# Patient Record
Sex: FEMALE | Race: OTHER | HISPANIC OR LATINO
[De-identification: names, ages, dates, MRNs, and addresses within clinical notes are randomized per-mention and may not be internally consistent; named-entity substitution may affect disease eponyms.]

---

## 2021-10-18 PROBLEM — Z00.00 ENCOUNTER FOR PREVENTIVE HEALTH EXAMINATION: Status: ACTIVE | Noted: 2021-10-18

## 2021-10-19 NOTE — PAST MEDICAL HISTORY
[Menstruating] : The patient is menstruating [Definite ___ (Date)] : the last menstrual period was [unfilled] [Total Preg ___] : G[unfilled] [Live Births ___] : P[unfilled]  [Abortions ___] : Abortions:[unfilled]

## 2021-10-25 ENCOUNTER — APPOINTMENT (OUTPATIENT)
Dept: GYNECOLOGIC ONCOLOGY | Facility: CLINIC | Age: 31
End: 2021-10-25
Payer: MEDICAID

## 2021-10-25 VITALS
BODY MASS INDEX: 34.55 KG/M2 | HEART RATE: 71 BPM | HEIGHT: 60 IN | SYSTOLIC BLOOD PRESSURE: 129 MMHG | DIASTOLIC BLOOD PRESSURE: 82 MMHG | WEIGHT: 176 LBS

## 2021-10-25 DIAGNOSIS — Z80.6 FAMILY HISTORY OF LEUKEMIA: ICD-10-CM

## 2021-10-25 PROCEDURE — 99205 OFFICE O/P NEW HI 60 MIN: CPT | Mod: 25

## 2021-10-25 PROCEDURE — 57420 EXAM OF VAGINA W/SCOPE: CPT

## 2021-10-25 NOTE — LETTER BODY
[Dear  ___] : Dear  [unfilled], [FreeTextEntry2] : I had the pleasure of seeing Ashwini Eisenberg in consultation regarding the recent finding of BECKY on a cervical biopsy. An ECC was negative. The preceding Pap test had evidence of ASC-H and AGC, with high risk HPV (16) identified. \par \par After an examination and colposcopy, we discussed the nature of lower genital tract disease, her clinical findings, and management options, including my advise for [facility] review of the Pap and biopsies. We discussed my advise for an excisional biopsy, the nature and attendant risks of we discussed thoroughly. We also noted the possible need for additional intervention as well as her desire to retain fertility if possible. \par \par I will keep you informed as to her findings and disposition.

## 2021-10-25 NOTE — HISTORY OF PRESENT ILLNESS
[FreeTextEntry1] : Referring GYN - Dr. Vi Zelaya\par PCP - Dr. Chilango Faria\par \par Ms. Eisenberg is a 30 yo  premenopausal female (LMP 10/4/21)who presents for initial consultation at the request of Dr. Zelaya for the management of cervical dysplasia. Patient was seen for GYN exam 21 at which time PAP was noted to be ASC-H, HrHPV +, atypical glandular cells. She returned for colposcopy 10/2/21 revealed AWC at 10:00 which was biopsies and ECC was performed. Biopsy at 10:00 revealed BECKY 3. She was referred here for further management. \par \par She desires future fertility. \par \par She reports postcoital bleeding. She is currently on OCPS. She is a non smoker and denies immunosuppressants. She did not receive the Gardasil vaccine series. Tolerating PO without N/V. Denies a change in appetite or weight. Reports normal bowel and urinary function. No other associated signs or symptoms. \par \par PAP history:\par PAP on 21: ASC-H, HrHPV + - colposcopy was negative\par PAP on 21: ASC-H, HrHPV + (16), atypical glandular cells: endocervical type, favor neoplastic\par Colposcopy on 10/2/21 - AWC at 10:00 which was biopsies and ECC was performed. \par \par \par PATHOLOGY:\par 1) ECC, 21\par     a) benign endocervix\par 2) Cervical biopsy at 2:00 and 9:00, 21\par     a) squamous metaplasia\par 3) Cervical biopsy at 10:00, 10/3/21\par      a) BECKY 3 involving endocervical glands\par      b) ECC - benign endocervical mucosa\par \par PMHx: N/A\par PSHx: N/A\par Fam Hx: paternal uncle (leukemia)\par \par \par HCM:\par Mammogram: never\par CBE: 2021\par SBE: performs\par Colonoscopy: never\par COVID-19 vaccine series completed, 2021, Pfizer\par \par

## 2021-10-25 NOTE — PHYSICAL EXAM
[Absent] : CVAT: absent [Normal] : Recto-Vaginal Exam: Normal [Abnormal] : Bimanual Exam: Abnormal [de-identified] : Trace edema [de-identified] : Nab cysts, multiple [de-identified] : The uterus was enlarged to 10-12 weeks, mobile [de-identified] : The adnexa were nonpalpable [de-identified] : The uterus was enlarged

## 2021-10-25 NOTE — PROCEDURE
[Colposcopy] : colposcopy [Cervical Dysplasia] : cervical dysplasia [Patient] : the patient [Verbal Consent] : verbal consent was obtained prior to the procedure and is detailed in the patient's record [Site Verification] : site verification performed. [SCJ Fully Visualized] : the squamocolumnar junction was fully visualized [No Abnormalities] : no abnormalities seen [Acetowhite ___ o'clock] : ascetowhite changes at the [unfilled] ~Uo'clock position [Punctation ___ o'clock] : punctation at the [unfilled] ~Uo'clock position [Direct Pressure] : direct pressure [No Complications] : none [Tolerated Well] : the patient tolerated the procedure well [Post procedure instructions and information given] : post procedure instructions and information given and reviewed with patient. [Atypical Vessels ___ o'clock] : no atypical vessels [Mosaicism ___ o'clock] : no mosaicism [Biopsies Taken: # ___] : no biopsies were taken of the cervix [ECC Done] : an endocervical curettage was not performed [FreeTextEntry3] : See diagram; throughout area noted [de-identified] : Vagina evaluated

## 2021-10-25 NOTE — DISCUSSION/SUMMARY
[Reviewed Clinical Lab Test(s)] : Results of clinical tests were reviewed. [FreeTextEntry1] : I met with the pt (without those accompanying her at her request. \par -We discussed the clinical findings and nature of LGT Dz (incl HPV [answering her questions r/t it being an STI and male eval]). She hopes to retain fertiliy (noting a prior disc of hyst with her referring gyn).\par -Management options were reviewed, including my advise for an excisional biopsy. We disc the tech aspects of both LEEP and CKC and dist them from ablative therapies. \par -Periop and recuperative issues were discussed, as were the poss hazards and sequelae (incl OB) of an excisional biopsy. \par -A diagram was drawn and a copy provided.\par -I advised review of the Pap and Bx's.\par -Her instructions were reviewed.\par -All Q/A.\par -She will call nw for the review and final recs.

## 2021-10-28 ENCOUNTER — OUTPATIENT (OUTPATIENT)
Dept: OUTPATIENT SERVICES | Facility: HOSPITAL | Age: 31
LOS: 1 days | End: 2021-10-28
Payer: COMMERCIAL

## 2021-10-28 DIAGNOSIS — C80.1 MALIGNANT (PRIMARY) NEOPLASM, UNSPECIFIED: ICD-10-CM

## 2021-10-29 ENCOUNTER — RESULT REVIEW (OUTPATIENT)
Age: 31
End: 2021-10-29

## 2021-10-29 PROCEDURE — 88321 CONSLTJ&REPRT SLD PREP ELSWR: CPT

## 2021-11-01 ENCOUNTER — RESULT REVIEW (OUTPATIENT)
Age: 31
End: 2021-11-01

## 2021-11-03 ENCOUNTER — NON-APPOINTMENT (OUTPATIENT)
Age: 31
End: 2021-11-03

## 2021-12-01 ENCOUNTER — OUTPATIENT (OUTPATIENT)
Dept: OUTPATIENT SERVICES | Facility: HOSPITAL | Age: 31
LOS: 1 days | End: 2021-12-01

## 2021-12-01 VITALS
OXYGEN SATURATION: 98 % | HEIGHT: 60 IN | RESPIRATION RATE: 16 BRPM | DIASTOLIC BLOOD PRESSURE: 70 MMHG | HEART RATE: 88 BPM | WEIGHT: 177.91 LBS | TEMPERATURE: 97 F | SYSTOLIC BLOOD PRESSURE: 110 MMHG

## 2021-12-01 DIAGNOSIS — D06.9 CARCINOMA IN SITU OF CERVIX, UNSPECIFIED: ICD-10-CM

## 2021-12-01 DIAGNOSIS — Z98.890 OTHER SPECIFIED POSTPROCEDURAL STATES: Chronic | ICD-10-CM

## 2021-12-01 LAB
ALBUMIN SERPL ELPH-MCNC: 4.3 G/DL — SIGNIFICANT CHANGE UP (ref 3.3–5)
ALP SERPL-CCNC: 96 U/L — SIGNIFICANT CHANGE UP (ref 40–120)
ALT FLD-CCNC: 24 U/L — SIGNIFICANT CHANGE UP (ref 4–33)
ANION GAP SERPL CALC-SCNC: 11 MMOL/L — SIGNIFICANT CHANGE UP (ref 7–14)
AST SERPL-CCNC: 17 U/L — SIGNIFICANT CHANGE UP (ref 4–32)
BILIRUB SERPL-MCNC: 0.2 MG/DL — SIGNIFICANT CHANGE UP (ref 0.2–1.2)
BUN SERPL-MCNC: 12 MG/DL — SIGNIFICANT CHANGE UP (ref 7–23)
CALCIUM SERPL-MCNC: 9.5 MG/DL — SIGNIFICANT CHANGE UP (ref 8.4–10.5)
CHLORIDE SERPL-SCNC: 103 MMOL/L — SIGNIFICANT CHANGE UP (ref 98–107)
CO2 SERPL-SCNC: 25 MMOL/L — SIGNIFICANT CHANGE UP (ref 22–31)
CREAT SERPL-MCNC: 0.5 MG/DL — SIGNIFICANT CHANGE UP (ref 0.5–1.3)
GLUCOSE SERPL-MCNC: 81 MG/DL — SIGNIFICANT CHANGE UP (ref 70–99)
HCG UR QL: NEGATIVE — SIGNIFICANT CHANGE UP
HCT VFR BLD CALC: 43.3 % — SIGNIFICANT CHANGE UP (ref 34.5–45)
HGB BLD-MCNC: 13.7 G/DL — SIGNIFICANT CHANGE UP (ref 11.5–15.5)
MCHC RBC-ENTMCNC: 31.6 GM/DL — LOW (ref 32–36)
MCHC RBC-ENTMCNC: 31.6 PG — SIGNIFICANT CHANGE UP (ref 27–34)
MCV RBC AUTO: 100 FL — SIGNIFICANT CHANGE UP (ref 80–100)
NRBC # BLD: 0 /100 WBCS — SIGNIFICANT CHANGE UP
NRBC # FLD: 0 K/UL — SIGNIFICANT CHANGE UP
PLATELET # BLD AUTO: 288 K/UL — SIGNIFICANT CHANGE UP (ref 150–400)
POTASSIUM SERPL-MCNC: 3.6 MMOL/L — SIGNIFICANT CHANGE UP (ref 3.5–5.3)
POTASSIUM SERPL-SCNC: 3.6 MMOL/L — SIGNIFICANT CHANGE UP (ref 3.5–5.3)
PROT SERPL-MCNC: 7.5 G/DL — SIGNIFICANT CHANGE UP (ref 6–8.3)
RBC # BLD: 4.33 M/UL — SIGNIFICANT CHANGE UP (ref 3.8–5.2)
RBC # FLD: 12.7 % — SIGNIFICANT CHANGE UP (ref 10.3–14.5)
SODIUM SERPL-SCNC: 139 MMOL/L — SIGNIFICANT CHANGE UP (ref 135–145)
WBC # BLD: 7.53 K/UL — SIGNIFICANT CHANGE UP (ref 3.8–10.5)
WBC # FLD AUTO: 7.53 K/UL — SIGNIFICANT CHANGE UP (ref 3.8–10.5)

## 2021-12-01 RX ORDER — SODIUM CHLORIDE 9 MG/ML
1000 INJECTION, SOLUTION INTRAVENOUS
Refills: 0 | Status: DISCONTINUED | OUTPATIENT
Start: 2021-12-07 | End: 2021-12-21

## 2021-12-01 NOTE — H&P PST ADULT - HISTORY OF PRESENT ILLNESS
30 y/o female presents to PST for pre op evaluation with hx of abnormal pap smear. S/P Colposcopy    30 y/o female presents to [facility] for pre op evaluation with hx of abnormal pap smear. S/P Colposcopy. 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 Now schedule for cold knife biopsy

## 2021-12-01 NOTE — H&P PST ADULT - PROBLEM SELECTOR PLAN 1
Schedule for cold knife biopsy tentatively for 12/07/2021. Pre op instructions, famotidine given and explained. Pt verbalized understanding.   Pt instructed to bring urine specimen on day of surgery, urine cup given to pt who verbalized understanding.  Pt confirmed schedule appt for covid test pre op

## 2021-12-01 NOTE — H&P PST ADULT - ATTENDING COMMENTS
Seen in ASU with her support person. She reports no changes. Planned procedure disc. Consent form reviewed. Instructions disc, incl VB, VD, pelvic rest, pain mgmt, f/u. All Q/A.

## 2021-12-01 NOTE — H&P PST ADULT - NEGATIVE GASTROINTESTINAL SYMPTOMS
no nausea/no vomiting/no diarrhea/no constipation/no change in bowel habits/no abdominal pain/no steatorrhea/no jaundice/no hiccoughs

## 2021-12-04 ENCOUNTER — APPOINTMENT (OUTPATIENT)
Dept: DISASTER EMERGENCY | Facility: CLINIC | Age: 31
End: 2021-12-04

## 2021-12-04 DIAGNOSIS — Z01.818 ENCOUNTER FOR OTHER PREPROCEDURAL EXAMINATION: ICD-10-CM

## 2021-12-05 LAB — SARS-COV-2 N GENE NPH QL NAA+PROBE: NOT DETECTED

## 2021-12-06 ENCOUNTER — TRANSCRIPTION ENCOUNTER (OUTPATIENT)
Age: 31
End: 2021-12-06

## 2021-12-06 NOTE — ASU PATIENT PROFILE, ADULT - FALL HARM RISK - UNIVERSAL INTERVENTIONS
Bed in lowest position, wheels locked, appropriate side rails in place/Call bell, personal items and telephone in reach/Instruct patient to call for assistance before getting out of bed or chair/Non-slip footwear when patient is out of bed/[place] to call system/Physically safe environment - no spills, clutter or unnecessary equipment/Purposeful Proactive Rounding/Room/bathroom lighting operational, light cord in reach

## 2021-12-07 ENCOUNTER — RESULT REVIEW (OUTPATIENT)
Age: 31
End: 2021-12-07

## 2021-12-07 ENCOUNTER — APPOINTMENT (OUTPATIENT)
Dept: GYNECOLOGIC ONCOLOGY | Facility: HOSPITAL | Age: 31
End: 2021-12-07

## 2021-12-07 ENCOUNTER — OUTPATIENT (OUTPATIENT)
Dept: OUTPATIENT SERVICES | Facility: HOSPITAL | Age: 31
LOS: 1 days | Discharge: HOME | End: 2021-12-07
Payer: MEDICAID

## 2021-12-07 VITALS
WEIGHT: 177.91 LBS | SYSTOLIC BLOOD PRESSURE: 122 MMHG | DIASTOLIC BLOOD PRESSURE: 80 MMHG | HEIGHT: 60 IN | HEART RATE: 88 BPM | OXYGEN SATURATION: 100 % | TEMPERATURE: 99 F | RESPIRATION RATE: 18 BRPM

## 2021-12-07 VITALS
OXYGEN SATURATION: 94 % | DIASTOLIC BLOOD PRESSURE: 78 MMHG | RESPIRATION RATE: 18 BRPM | SYSTOLIC BLOOD PRESSURE: 100 MMHG

## 2021-12-07 DIAGNOSIS — D06.9 CARCINOMA IN SITU OF CERVIX, UNSPECIFIED: ICD-10-CM

## 2021-12-07 DIAGNOSIS — Z98.890 OTHER SPECIFIED POSTPROCEDURAL STATES: Chronic | ICD-10-CM

## 2021-12-07 LAB — HCG UR QL: NEGATIVE — SIGNIFICANT CHANGE UP

## 2021-12-07 PROCEDURE — 88305 TISSUE EXAM BY PATHOLOGIST: CPT | Mod: 26

## 2021-12-07 PROCEDURE — 88307 TISSUE EXAM BY PATHOLOGIST: CPT | Mod: 26

## 2021-12-07 PROCEDURE — 57520 CONIZATION OF CERVIX: CPT

## 2021-12-07 RX ORDER — IBUPROFEN 200 MG
1 TABLET ORAL
Qty: 0 | Refills: 0 | DISCHARGE

## 2021-12-07 NOTE — BRIEF OPERATIVE NOTE - OPERATION/FINDINGS
EUA: Nl v, v, parous cx, pm. The uterus was not palpably enlarged, There were no adnexal masses.   A wide deep cone was fashioned intact and around the Lugol's neg areas. An ECC was then performed.   The cone bed and margins were cauterized. After a period of observation, monsel's was applied. After hemostasis was confirmed. SS placed, not secured.   Further obs noted hemostasis.   Urine clear pre- and post-op.  CC.

## 2021-12-07 NOTE — ASU DISCHARGE PLAN (ADULT/PEDIATRIC) - CARE PROVIDER_API CALL
Mitch Varela)  Gynecologic Oncology; Obstetrics and Gynecology  [address] [place] [address]  Phone: [phone]  Fax: [phone]  Follow Up Time:    WDL

## 2021-12-07 NOTE — ASU DISCHARGE PLAN (ADULT/PEDIATRIC) - ASU DC SPECIAL INSTRUCTIONSFT
Regular diet as tolerated, regular activity as tolerated, no heavy lifting for first two weeks.  Nothing per vagina: no intercourse, tampons or douching.  Call your provider if you experience fevers, chills, worsening abdominal pain, inability to urinate or worsening vaginal bleeding.  Follow up with Dr. Varela on 12/20/21 at 4:40pm.

## 2021-12-07 NOTE — ASU DISCHARGE PLAN (ADULT/PEDIATRIC) - NS MD DC FALL RISK RISK
For information on Fall & Injury Prevention, visit: [email or web address] OR  [email or web address] OR  https://www.cdc.gov/steadi/patient.html

## 2021-12-07 NOTE — ASU DISCHARGE PLAN (ADULT/PEDIATRIC) - NURSING INSTRUCTIONS
You received IV Tylenol for pain management at __2pm_. Please DO NOT take any Tylenol (Acetaminophen) containing products, such as Vicodin, Percocet, Excedrin, and cold medications for the next 6 hours (until __8_ PM). DO NOT TAKE MORE THAN 4000 MG OF TYLENOL in a 24 hour period.     You received IV Toradol for pain management at _2:45pm__. Please DO NOT take Motrin/Ibuprofen/Advil/Aleve/NSAIDs (Non-Steroidal Anti-Inflammatory Drugs) for the next 6 hours (until _8:45__ PM).

## 2021-12-08 ENCOUNTER — NON-APPOINTMENT (OUTPATIENT)
Age: 31
End: 2021-12-08

## 2021-12-10 ENCOUNTER — NON-APPOINTMENT (OUTPATIENT)
Age: 31
End: 2021-12-10

## 2021-12-10 LAB — SURGICAL PATHOLOGY STUDY: SIGNIFICANT CHANGE UP

## 2021-12-20 ENCOUNTER — APPOINTMENT (OUTPATIENT)
Dept: GYNECOLOGIC ONCOLOGY | Facility: CLINIC | Age: 31
End: 2021-12-20
Payer: MEDICAID

## 2021-12-20 VITALS — TEMPERATURE: 98 F | SYSTOLIC BLOOD PRESSURE: 134 MMHG | HEART RATE: 86 BPM | DIASTOLIC BLOOD PRESSURE: 86 MMHG

## 2021-12-20 PROCEDURE — 99024 POSTOP FOLLOW-UP VISIT: CPT

## 2021-12-23 NOTE — REASON FOR VISIT
[de-identified] : 12/7/21 [de-identified] : Cone biopsy/ECC [de-identified] : She reports feeling well. No abnl VB or VD. No fever. No GI or  concerns. \par

## 2021-12-23 NOTE — DISCUSSION/SUMMARY
[Doing Well] : is doing well [FreeTextEntry9] : s/nt/nd.  [de-identified] : op site healing with the some residual hemostatic material.

## 2021-12-23 NOTE — LETTER BODY
[Dear  ___] : Dear  [unfilled], [FreeTextEntry2] : This letter is to provide follow up on Ashwini Eisenberg who underwent a cone biopsy. \par \par She is recuperating well. The final pathology revealed high grade BECKY with gland involvement. The margins of resection and ECC were negative. A copy of the pathology report was provided to her. \par \par I have asked her to return for an evaluation in 6 months and see you for general gynecologic care.

## 2021-12-23 NOTE — ASSESSMENT
[FreeTextEntry1] : She is recuperating well and we discussed her path; a copy was provided. Her instructions were discussed. All Q/A. She'll RTO in 6m and see her primary Gyn for WWC.\par

## 2022-07-09 PROBLEM — E66.9 OBESITY, UNSPECIFIED: Chronic | Status: ACTIVE | Noted: 2021-12-01

## 2022-08-08 ENCOUNTER — NON-APPOINTMENT (OUTPATIENT)
Age: 32
End: 2022-08-08

## 2022-08-08 ENCOUNTER — APPOINTMENT (OUTPATIENT)
Dept: GYNECOLOGIC ONCOLOGY | Facility: CLINIC | Age: 32
End: 2022-08-08

## 2022-08-08 VITALS
BODY MASS INDEX: 34.75 KG/M2 | WEIGHT: 177 LBS | HEART RATE: 76 BPM | DIASTOLIC BLOOD PRESSURE: 81 MMHG | HEIGHT: 60 IN | SYSTOLIC BLOOD PRESSURE: 128 MMHG

## 2022-08-08 DIAGNOSIS — R87.611 ATYPICAL SQUAMOUS CELLS CANNOT EXCLUDE HIGH GRADE SQUAMOUS INTRAEPITHELIAL LESION ON CYTOLOGIC SMEAR OF CERVIX (ASC-H): ICD-10-CM

## 2022-08-08 PROCEDURE — 57454 BX/CURETT OF CERVIX W/SCOPE: CPT

## 2022-08-08 PROCEDURE — 99213 OFFICE O/P EST LOW 20 MIN: CPT | Mod: 25

## 2022-08-09 LAB — HPV HIGH+LOW RISK DNA PNL CVX: NOT DETECTED

## 2022-08-14 LAB — CYTOLOGY CVX/VAG DOC THIN PREP: ABNORMAL

## 2022-08-22 LAB — CORE LAB BIOPSY: NORMAL

## 2023-03-03 ENCOUNTER — TRANSCRIPTION ENCOUNTER (OUTPATIENT)
Age: 33
End: 2023-03-03

## 2023-03-03 ENCOUNTER — APPOINTMENT (OUTPATIENT)
Dept: GYNECOLOGIC ONCOLOGY | Facility: CLINIC | Age: 33
End: 2023-03-03
Payer: MEDICAID

## 2023-03-03 VITALS — DIASTOLIC BLOOD PRESSURE: 82 MMHG | HEART RATE: 88 BPM | HEIGHT: 60 IN | SYSTOLIC BLOOD PRESSURE: 123 MMHG

## 2023-03-03 VITALS — WEIGHT: 177 LBS | BODY MASS INDEX: 34.75 KG/M2 | HEIGHT: 60 IN

## 2023-03-03 DIAGNOSIS — R87.619 UNSPECIFIED ABNORMAL CYTOLOGICAL FINDINGS IN SPECIMENS FROM CERVIX UTERI: ICD-10-CM

## 2023-03-03 DIAGNOSIS — N87.0 MILD CERVICAL DYSPLASIA: ICD-10-CM

## 2023-03-03 PROCEDURE — 99213 OFFICE O/P EST LOW 20 MIN: CPT | Mod: 25

## 2023-03-03 PROCEDURE — 57454 BX/CURETT OF CERVIX W/SCOPE: CPT

## 2023-03-06 LAB — HPV HIGH+LOW RISK DNA PNL CVX: NOT DETECTED

## 2023-03-09 LAB — CYTOLOGY CVX/VAG DOC THIN PREP: ABNORMAL

## 2023-03-17 ENCOUNTER — NON-APPOINTMENT (OUTPATIENT)
Age: 33
End: 2023-03-17

## 2023-04-02 LAB — CORE LAB BIOPSY: NORMAL

## 2023-09-11 PROBLEM — D06.9 CIN III (CERVICAL INTRAEPITHELIAL NEOPLASIA III): Status: ACTIVE | Noted: 2021-10-19

## 2023-09-18 ENCOUNTER — APPOINTMENT (OUTPATIENT)
Dept: GYNECOLOGIC ONCOLOGY | Facility: CLINIC | Age: 33
End: 2023-09-18
Payer: COMMERCIAL

## 2023-09-18 VITALS
WEIGHT: 175 LBS | HEIGHT: 60 IN | DIASTOLIC BLOOD PRESSURE: 78 MMHG | SYSTOLIC BLOOD PRESSURE: 117 MMHG | HEART RATE: 96 BPM | BODY MASS INDEX: 34.36 KG/M2

## 2023-09-18 DIAGNOSIS — D06.9 CARCINOMA IN SITU OF CERVIX, UNSPECIFIED: ICD-10-CM

## 2023-09-18 DIAGNOSIS — B97.7 PAPILLOMAVIRUS AS THE CAUSE OF DISEASES CLASSIFIED ELSEWHERE: ICD-10-CM

## 2023-09-18 DIAGNOSIS — N87.9 DYSPLASIA OF CERVIX UTERI, UNSPECIFIED: ICD-10-CM

## 2023-09-18 PROCEDURE — 99213 OFFICE O/P EST LOW 20 MIN: CPT

## 2023-09-24 LAB — CYTOLOGY CVX/VAG DOC THIN PREP: NORMAL

## 2024-03-20 NOTE — ASU PREOP CHECKLIST - TO WHOM
[General Appearance - Well Developed] : well developed [Well Groomed] : well groomed [Normal Appearance] : normal appearance [General Appearance - Well Nourished] : well nourished [General Appearance - In No Acute Distress] : no acute distress [No Deformities] : no deformities [Eyelids - No Xanthelasma] : the eyelids demonstrated no xanthelasmas [Normal Conjunctiva] : the conjunctiva exhibited no abnormalities [Normal Oral Mucosa] : normal oral mucosa [No Oral Pallor] : no oral pallor [Normal Jugular Venous A Waves Present] : normal jugular venous A waves present [No Oral Cyanosis] : no oral cyanosis [Normal Jugular Venous V Waves Present] : normal jugular venous V waves present [No Jugular Venous Marshall A Waves] : no jugular venous marshall A waves [Respiration, Rhythm And Depth] : normal respiratory rhythm and effort [Auscultation Breath Sounds / Voice Sounds] : lungs were clear to auscultation bilaterally [Exaggerated Use Of Accessory Muscles For Inspiration] : no accessory muscle use [Heart Sounds] : normal S1 and S2 [Heart Rate And Rhythm] : heart rate and rhythm were normal [Abdomen Soft] : soft [Murmurs] : no murmurs present [Abdomen Tenderness] : non-tender [Abnormal Walk] : normal gait [Abdomen Mass (___ Cm)] : no abdominal mass palpated [Nail Clubbing] : no clubbing of the fingernails [Gait - Sufficient For Exercise Testing] : the gait was sufficient for exercise testing [Petechial Hemorrhages (___cm)] : no petechial hemorrhages [Cyanosis, Localized] : no localized cyanosis [Skin Color & Pigmentation] : normal skin color and pigmentation [] : no rash [No Venous Stasis] : no venous stasis [Skin Lesions] : no skin lesions [No Skin Ulcers] : no skin ulcer [No Xanthoma] : no  xanthoma was observed polina stack

## 2025-02-20 NOTE — H&P PST ADULT - GASTROINTESTINAL
Patient Education        Learning About Being Physically Active  What is physical activity?     Being physically active means doing any kind of activity that gets your body moving.  The types of physical activity that can help you get fit and stay healthy include:  Aerobic or \"cardio\" activities. These make your heart beat faster and make you breathe harder, such as brisk walking, riding a bike, or running. They strengthen your heart and lungs and build up your endurance.  Strength training activities. These make your muscles work against, or \"resist,\" something. Examples include lifting weights or doing push-ups. These activities help tone and strengthen your muscles and bones.  Stretches. These let you move your joints and muscles through their full range of motion. Stretching helps you be more flexible.  Reaching a balance between these three types of physical activity is important because each one contributes to your overall fitness.  What are the benefits of being active?  Being active is one of the best things you can do for your health. It helps you to:  Feel stronger and have more energy to do all the things you like to do.  Focus better at school or work.  Feel, think, and sleep better.  Reach and stay at a healthy weight.  Lose fat and build lean muscle.  Lower your risk for serious health problems, including diabetes, heart attack, high blood pressure, and some cancers.  Keep your heart, lungs, bones, muscles, and joints strong and healthy.  How can you make being active part of your life?  Start slowly. Make it your long-term goal to get at least 30 minutes of exercise on most days of the week. Walking is a good choice. You also may want to do other activities, such as running, swimming, cycling, or playing tennis or team sports.  Pick activities that you like--ones that make your heart beat faster, your muscles stronger, and your muscles and joints more flexible. If you find more than one thing you like  details… detailed exam